# Patient Record
Sex: MALE | Race: WHITE | NOT HISPANIC OR LATINO | Employment: FULL TIME | URBAN - NONMETROPOLITAN AREA
[De-identification: names, ages, dates, MRNs, and addresses within clinical notes are randomized per-mention and may not be internally consistent; named-entity substitution may affect disease eponyms.]

---

## 2017-10-23 ENCOUNTER — TELEPHONE (OUTPATIENT)
Dept: UROLOGY | Facility: CLINIC | Age: 32
End: 2017-10-23

## 2017-10-24 ENCOUNTER — OFFICE VISIT (OUTPATIENT)
Dept: URGENT CARE | Age: 32
End: 2017-10-24
Payer: COMMERCIAL

## 2017-10-24 VITALS
DIASTOLIC BLOOD PRESSURE: 84 MMHG | SYSTOLIC BLOOD PRESSURE: 122 MMHG | HEART RATE: 87 BPM | HEIGHT: 69 IN | OXYGEN SATURATION: 97 % | BODY MASS INDEX: 38.33 KG/M2 | WEIGHT: 258.8 LBS | RESPIRATION RATE: 20 BRPM | TEMPERATURE: 99.1 F

## 2017-10-24 DIAGNOSIS — J02.9 SORE THROAT: Primary | ICD-10-CM

## 2017-10-24 DIAGNOSIS — R05.9 COUGH: ICD-10-CM

## 2017-10-24 DIAGNOSIS — R09.81 SINUS CONGESTION: ICD-10-CM

## 2017-10-24 LAB — S PYO AG THROAT QL: NORMAL

## 2017-10-24 PROCEDURE — 87880 STREP A ASSAY W/OPTIC: CPT | Performed by: NURSE PRACTITIONER

## 2017-10-24 PROCEDURE — 99213 OFFICE O/P EST LOW 20 MIN: CPT | Performed by: NURSE PRACTITIONER

## 2017-10-24 RX ORDER — METHYLPREDNISOLONE 4 MG/1
TABLET ORAL
Qty: 1 KIT | Refills: 0 | Status: SHIPPED | OUTPATIENT
Start: 2017-10-24 | End: 2017-10-30

## 2017-10-24 RX ORDER — FLUTICASONE PROPIONATE 50 MCG
1 SPRAY, SUSPENSION (ML) NASAL DAILY
Qty: 1 BOTTLE | Refills: 3 | Status: SHIPPED | OUTPATIENT
Start: 2017-10-24

## 2017-10-24 RX ORDER — BENZONATATE 100 MG/1
100 CAPSULE ORAL 3 TIMES DAILY PRN
Qty: 21 CAPSULE | Refills: 0 | Status: SHIPPED | OUTPATIENT
Start: 2017-10-24 | End: 2017-10-31

## 2017-10-24 ASSESSMENT — ENCOUNTER SYMPTOMS
GASTROINTESTINAL NEGATIVE: 1
SHORTNESS OF BREATH: 0
RHINORRHEA: 0
SORE THROAT: 0
EYES NEGATIVE: 1
ALLERGIC/IMMUNOLOGIC NEGATIVE: 1
VOICE CHANGE: 1
TROUBLE SWALLOWING: 0
COUGH: 1

## 2017-10-24 NOTE — PROGRESS NOTES
[address] [facility]ARE  [address] [facility] [place] [address]  Dept: [phone]  Loc: [phone]    Allan Norman is a 34 y.o. male who presents today for his medical conditions/complaints as noted below. Allan Norman is c/o of Head Congestion (x 4 days); Pharyngitis; Cough (started today with productive yellow mucus); and Chest Congestion        HPI:     HPI   Pt presents to clinic with c/o cough and congestion for 2 days. States he lost his voice over the weekend but now it's back to normal. Denies fever, SOB, h/a,or any other symptoms. Results for orders placed or performed in visit on 10/24/17   POCT rapid strep A   Result Value Ref Range    Strep A Ag None Detected None Detected         No past medical history on file. Past Surgical History:   Procedure Laterality Date    COLONOSCOPY  12/06/2016    Dr Oglesby Artist internal hemorrhoids, prn    IA COLONOSCOPY FLX DX W/COLLJ SPEC WHEN PFRMD N/A 12/5/2016    COLONOSCOPY DIAGNOSTIC OR SCREENING performed by Suyapa Gloria DO at [facility] OR    WISDOM TOOTH EXTRACTION         Family History   Problem Relation Age of Onset    Colon Cancer Neg Hx     Colon Polyps Neg Hx     Esophageal Cancer Neg Hx     Liver Cancer Neg Hx     Liver Disease Neg Hx     Rectal Cancer Neg Hx     Stomach Cancer Neg Hx        Social History   Substance Use Topics    Smoking status: Never Smoker    Smokeless tobacco: Not on file    Alcohol use 6.0 oz/week     5 Cans of beer, 5 Shots of liquor per week      Current Outpatient Prescriptions   Medication Sig Dispense Refill    methylPREDNISolone (MEDROL, MARIBEL,) 4 MG tablet Take by mouth. 1 kit 0    fluticasone (FLONASE) 50 MCG/ACT nasal spray 1 spray by Nasal route daily 1 Bottle 3    benzonatate (TESSALON PERLES) 100 MG capsule Take 1 capsule by mouth 3 times daily as needed for Cough 21 capsule 0     No current facility-administered medications for this visit. with treatment plan. Follow up as directed. Patient Instructions   1. Take medrol dose pack as prescribed  2. Use flonase daily  3. Follow up with PCP  4.  Return to clinic as needed        Electronically signed by Yonathan Lama CNP on 10/24/2017 at 10:01 AM

## 2017-10-24 NOTE — PATIENT INSTRUCTIONS
1. Take medrol dose pack as prescribed  2. Use flonase daily  3. Follow up with PCP  4.  Return to clinic as needed